# Patient Record
Sex: MALE | Race: BLACK OR AFRICAN AMERICAN | Employment: FULL TIME | URBAN - METROPOLITAN AREA
[De-identification: names, ages, dates, MRNs, and addresses within clinical notes are randomized per-mention and may not be internally consistent; named-entity substitution may affect disease eponyms.]

---

## 2020-06-02 RX ORDER — AZITHROMYCIN 500 MG/1
1000 TABLET, FILM COATED ORAL ONCE
Qty: 2 TABLET | Refills: 0 | Status: SHIPPED | OUTPATIENT
Start: 2020-06-02 | End: 2020-06-02

## 2023-06-27 ENCOUNTER — HOSPITAL ENCOUNTER (INPATIENT)
Age: 33
LOS: 1 days | Discharge: HOME | DRG: 881 | End: 2023-06-28
Attending: PSYCHIATRY & NEUROLOGY | Admitting: INTERNAL MEDICINE
Payer: OTHER GOVERNMENT

## 2023-06-27 PROBLEM — F32.A DEPRESSION WITH SUICIDAL IDEATION: Status: ACTIVE | Noted: 2023-06-27

## 2023-06-27 PROBLEM — R45.851 DEPRESSION WITH SUICIDAL IDEATION: Status: ACTIVE | Noted: 2023-06-27

## 2023-06-27 PROCEDURE — 6370000000 HC RX 637 (ALT 250 FOR IP): Performed by: PSYCHIATRY & NEUROLOGY

## 2023-06-27 PROCEDURE — 99222 1ST HOSP IP/OBS MODERATE 55: CPT | Performed by: INTERNAL MEDICINE

## 2023-06-27 PROCEDURE — 6370000000 HC RX 637 (ALT 250 FOR IP): Performed by: INTERNAL MEDICINE

## 2023-06-27 PROCEDURE — 1240000000 HC EMOTIONAL WELLNESS R&B

## 2023-06-27 RX ORDER — HYDROXYZINE 50 MG/1
50 TABLET, FILM COATED ORAL 3 TIMES DAILY PRN
Status: DISCONTINUED | OUTPATIENT
Start: 2023-06-27 | End: 2023-06-28 | Stop reason: HOSPADM

## 2023-06-27 RX ORDER — POLYETHYLENE GLYCOL 3350 17 G/17G
17 POWDER, FOR SOLUTION ORAL DAILY PRN
Status: DISCONTINUED | OUTPATIENT
Start: 2023-06-27 | End: 2023-06-28 | Stop reason: HOSPADM

## 2023-06-27 RX ORDER — IBUPROFEN 400 MG/1
400 TABLET ORAL EVERY 6 HOURS PRN
Status: DISCONTINUED | OUTPATIENT
Start: 2023-06-27 | End: 2023-06-28 | Stop reason: HOSPADM

## 2023-06-27 RX ORDER — ACETAMINOPHEN 325 MG/1
650 TABLET ORAL EVERY 6 HOURS PRN
Status: DISCONTINUED | OUTPATIENT
Start: 2023-06-27 | End: 2023-06-28 | Stop reason: HOSPADM

## 2023-06-27 RX ORDER — POTASSIUM CHLORIDE 20 MEQ/1
40 TABLET, EXTENDED RELEASE ORAL ONCE
Status: COMPLETED | OUTPATIENT
Start: 2023-06-27 | End: 2023-06-27

## 2023-06-27 RX ORDER — HALOPERIDOL 5 MG/ML
5 INJECTION INTRAMUSCULAR EVERY 6 HOURS PRN
Status: DISCONTINUED | OUTPATIENT
Start: 2023-06-27 | End: 2023-06-28 | Stop reason: HOSPADM

## 2023-06-27 RX ORDER — MAGNESIUM HYDROXIDE/ALUMINUM HYDROXICE/SIMETHICONE 120; 1200; 1200 MG/30ML; MG/30ML; MG/30ML
30 SUSPENSION ORAL EVERY 6 HOURS PRN
Status: DISCONTINUED | OUTPATIENT
Start: 2023-06-27 | End: 2023-06-28 | Stop reason: HOSPADM

## 2023-06-27 RX ORDER — HALOPERIDOL 5 MG/1
5 TABLET ORAL EVERY 6 HOURS PRN
Status: DISCONTINUED | OUTPATIENT
Start: 2023-06-27 | End: 2023-06-28 | Stop reason: HOSPADM

## 2023-06-27 RX ORDER — POLYETHYLENE GLYCOL 3350 17 G
2 POWDER IN PACKET (EA) ORAL
Status: DISCONTINUED | OUTPATIENT
Start: 2023-06-27 | End: 2023-06-28 | Stop reason: HOSPADM

## 2023-06-27 RX ORDER — DIPHENHYDRAMINE HYDROCHLORIDE 50 MG/ML
50 INJECTION INTRAMUSCULAR; INTRAVENOUS EVERY 6 HOURS PRN
Status: DISCONTINUED | OUTPATIENT
Start: 2023-06-27 | End: 2023-06-28 | Stop reason: HOSPADM

## 2023-06-27 RX ORDER — TRAZODONE HYDROCHLORIDE 50 MG/1
50 TABLET ORAL NIGHTLY PRN
Status: DISCONTINUED | OUTPATIENT
Start: 2023-06-27 | End: 2023-06-28 | Stop reason: HOSPADM

## 2023-06-27 RX ADMIN — POTASSIUM CHLORIDE 40 MEQ: 1500 TABLET, EXTENDED RELEASE ORAL at 17:08

## 2023-06-27 RX ADMIN — TRAZODONE HYDROCHLORIDE 50 MG: 50 TABLET ORAL at 22:20

## 2023-06-27 RX ADMIN — IBUPROFEN 400 MG: 400 TABLET, FILM COATED ORAL at 18:50

## 2023-06-27 ASSESSMENT — SLEEP AND FATIGUE QUESTIONNAIRES
AVERAGE NUMBER OF SLEEP HOURS: 5
SLEEP PATTERN: DIFFICULTY FALLING ASLEEP;DISTURBED/INTERRUPTED SLEEP
DO YOU USE A SLEEP AID: YES
DO YOU HAVE DIFFICULTY SLEEPING: YES

## 2023-06-27 ASSESSMENT — LIFESTYLE VARIABLES
HOW MANY STANDARD DRINKS CONTAINING ALCOHOL DO YOU HAVE ON A TYPICAL DAY: PATIENT DOES NOT DRINK
HOW OFTEN DO YOU HAVE A DRINK CONTAINING ALCOHOL: NEVER

## 2023-06-27 ASSESSMENT — PAIN SCALES - GENERAL: PAINLEVEL_OUTOF10: 5

## 2023-06-27 ASSESSMENT — PATIENT HEALTH QUESTIONNAIRE - PHQ9: SUM OF ALL RESPONSES TO PHQ QUESTIONS 1-9: 17

## 2023-06-28 VITALS
RESPIRATION RATE: 14 BRPM | TEMPERATURE: 97.5 F | HEIGHT: 72 IN | DIASTOLIC BLOOD PRESSURE: 76 MMHG | BODY MASS INDEX: 25.73 KG/M2 | HEART RATE: 91 BPM | WEIGHT: 190 LBS | SYSTOLIC BLOOD PRESSURE: 107 MMHG | OXYGEN SATURATION: 100 %

## 2023-06-28 PROBLEM — F43.0 ACUTE STRESS REACTION: Status: ACTIVE | Noted: 2023-06-28

## 2023-06-28 PROCEDURE — APPSS60 APP SPLIT SHARED TIME 46-60 MINUTES

## 2023-06-28 PROCEDURE — 99236 HOSP IP/OBS SAME DATE HI 85: CPT | Performed by: PSYCHIATRY & NEUROLOGY

## 2023-08-04 ENCOUNTER — HOSPITAL ENCOUNTER (EMERGENCY)
Age: 33
Discharge: HOME | End: 2023-08-04
Attending: EMERGENCY MEDICINE
Payer: OTHER GOVERNMENT

## 2023-08-04 VITALS
RESPIRATION RATE: 16 BRPM | TEMPERATURE: 98.2 F | OXYGEN SATURATION: 100 % | HEIGHT: 71 IN | BODY MASS INDEX: 24.64 KG/M2 | SYSTOLIC BLOOD PRESSURE: 130 MMHG | WEIGHT: 176 LBS | DIASTOLIC BLOOD PRESSURE: 99 MMHG | HEART RATE: 76 BPM

## 2023-08-04 DIAGNOSIS — R30.0 DYSURIA: Primary | ICD-10-CM

## 2023-08-04 LAB
AMORPH SED URNS QL MICRO: ABNORMAL
BACTERIA URNS QL MICRO: ABNORMAL
BILIRUB UR QL STRIP: NEGATIVE
CHARACTER UR: ABNORMAL
CLARITY UR: ABNORMAL
COLOR UR: YELLOW
CRYSTALS URNS MICRO: ABNORMAL /HPF
EPI CELLS #/AREA URNS HPF: ABNORMAL /HPF (ref 0–5)
GLUCOSE UR STRIP-MCNC: NEGATIVE MG/DL
HGB UR QL STRIP.AUTO: NEGATIVE
KETONES UR STRIP-MCNC: NEGATIVE MG/DL
LEUKOCYTE ESTERASE UR QL STRIP: NEGATIVE
MUCOUS THREADS URNS QL MICRO: ABNORMAL
NITRITE UR QL STRIP: NEGATIVE
PH UR STRIP: 8 [PH] (ref 5–8)
PROT UR STRIP-MCNC: NEGATIVE MG/DL
RBC #/AREA URNS HPF: ABNORMAL /HPF (ref 0–2)
SP GR UR STRIP: 1.01 (ref 1–1.03)
UROBILINOGEN UR STRIP-ACNC: NORMAL EU/DL (ref 0–1)
WBC #/AREA URNS HPF: ABNORMAL /HPF (ref 0–5)

## 2023-08-04 PROCEDURE — 87491 CHLMYD TRACH DNA AMP PROBE: CPT

## 2023-08-04 PROCEDURE — 87661 TRICHOMONAS VAGINALIS AMPLIF: CPT

## 2023-08-04 PROCEDURE — 99283 EMERGENCY DEPT VISIT LOW MDM: CPT

## 2023-08-04 PROCEDURE — 87591 N.GONORRHOEAE DNA AMP PROB: CPT

## 2023-08-04 PROCEDURE — 81001 URINALYSIS AUTO W/SCOPE: CPT

## 2023-08-04 ASSESSMENT — PAIN SCALES - GENERAL: PAINLEVEL_OUTOF10: 1

## 2023-08-04 ASSESSMENT — LIFESTYLE VARIABLES
HOW OFTEN DO YOU HAVE A DRINK CONTAINING ALCOHOL: 2-4 TIMES A MONTH
HOW MANY STANDARD DRINKS CONTAINING ALCOHOL DO YOU HAVE ON A TYPICAL DAY: 1 OR 2

## 2023-08-04 ASSESSMENT — PAIN - FUNCTIONAL ASSESSMENT: PAIN_FUNCTIONAL_ASSESSMENT: 0-10

## 2023-08-04 NOTE — ED PROVIDER NOTES
Emergency Department         I reviewed the mid level provider's note and agree with the documented findings and we have discussed the plan of care. I have reviewed the emergency nurses triage note. I agree with the chief complaint, past medical history, past surgical history, allergies, medications, social and family history as documented unless otherwise noted below.        Gary Baumann DO  08/04/23 1035

## 2023-08-04 NOTE — DISCHARGE INSTRUCTIONS
you down the path to your own personal wellness. Please expect an automated text message or email from us so we can ask a few questions about your health and progress. Based on your answers, a clinician may call you back to offer help and instructions. Please understand that early in the process of an illness or injury, an emergency department workup can be falsely reassuring. If you notice any worsening, changing or persistent symptoms please call your family doctor or return to the ER [email or web address] Tell us how we did during your visit at [email or web address]   and let us know about your experience

## 2023-08-06 LAB
SOURCE: NORMAL
TRICHOMONAS VAGINALI, MOLECULAR: NEGATIVE

## 2023-08-07 LAB
CHLAMYDIA DNA UR QL NAA+PROBE: NEGATIVE
N GONORRHOEA DNA UR QL NAA+PROBE: NEGATIVE
SPECIMEN DESCRIPTION: NORMAL

## 2025-04-09 DIAGNOSIS — L73.1 PSEUDOFOLLICULITIS BARBAE: Primary | ICD-10-CM
